# Patient Record
Sex: FEMALE | URBAN - METROPOLITAN AREA
[De-identification: names, ages, dates, MRNs, and addresses within clinical notes are randomized per-mention and may not be internally consistent; named-entity substitution may affect disease eponyms.]

---

## 2018-01-24 ENCOUNTER — IMPORTED ENCOUNTER (OUTPATIENT)
Dept: URBAN - METROPOLITAN AREA CLINIC 31 | Facility: CLINIC | Age: 18
End: 2018-01-24

## 2018-01-24 PROCEDURE — 92310 CONTACT LENS FITTING OU: CPT

## 2018-01-24 PROCEDURE — 92004 COMPRE OPH EXAM NEW PT 1/>: CPT

## 2018-01-24 PROCEDURE — 92015 DETERMINE REFRACTIVE STATE: CPT

## 2019-03-26 NOTE — PATIENT DISCUSSION
POAG, OU:  INTRAOCULAR PRESSURE IS WITHIN ACCEPTABLE LIMITS. PATIENT INSTRUCTED TO CONTINUE _brimonidine and latanoprost ou_______ AND RETURN FOR FOLLOW-UP AS SCHEDULED.

## 2019-03-26 NOTE — PATIENT DISCUSSION
[facility] Retinal Vein Occlusion Counseling:  I have discussed the diagnosis and its pathophysiology with the patient. I have further explained the need for evaluation of underlying medical disorders with the patient's primary care doctor. Return for follow-up as scheduled or sooner if any change in symptoms.

## 2019-09-13 NOTE — PATIENT DISCUSSION
POAG, OU: INTRAOCULAR PRESSURE IS WITHIN ACCEPTABLE LIMITS. PT INSTRUCTED TO CONTINUE BRIMONIDINE AND LATANOPROST AND RETURN FOR FOLLOW-UP AS SCHEDULED.

## 2020-06-08 NOTE — PATIENT DISCUSSION
POAG, OU: INTRAOCULAR PRESSURE IS WITHIN ACCEPTABLE LIMITS. PT INSTRUCTED TO CONTINUE GTTS AND RETURN FOR FOLLOW-UP AS SCHEDULED.

## 2020-06-08 NOTE — PATIENT DISCUSSION
New Prescription: Maxitrol (neomycin-polymyxin-dexameth): ointment: 3.5-10,000-0.1 mg-unit/g-% a small amount twice a day into affected eye 06-

## 2020-10-28 NOTE — PATIENT DISCUSSION
Continue: Maxitrol (neomycin-polymyxin-dexameth): ointment: 3.5-10,000-0.1 mg-unit/g-% a small amount twice a day into affected eye 06-

## 2020-10-28 NOTE — PATIENT DISCUSSION
New Prescription: TobraDex (tobramycin-dexamethasone): drops,suspension: 0.3-0.1% 1 drop four times a day into both eyes 10-

## 2020-11-11 NOTE — PATIENT DISCUSSION
BLEPHAROCONJUNCTIVITIS, OU, IMPROVING. PT EDUCATION. TAPER TOBRADEX BID OU. MONITOR. RTO AS SCHEDULED.

## 2020-11-11 NOTE — PATIENT DISCUSSION
New Prescription: erythromycin (erythromycin): ointment: 5 mg/gram (0.5 %) a small amount twice a day into both eyes 11-

## 2020-11-11 NOTE — PATIENT DISCUSSION
Continue: TobraDex (tobramycin-dexamethasone): drops,suspension: 0.3-0.1% 1 drop four times a day into both eyes 10-

## 2022-04-02 ASSESSMENT — VISUAL ACUITY
OS_SC: 20/20
OD_CC: J1+14''
OD_CC: 20/200
OD_SC: 20/20
OS_CC: J1+14''
OS_CC: 20/400

## 2022-04-02 ASSESSMENT — TONOMETRY
OD_IOP_MMHG: 14
OS_IOP_MMHG: 14

## 2024-05-20 NOTE — PATIENT DISCUSSION
Anesthesia Post Evaluation    Patient: Chaya Walls    Procedure(s) Performed: Procedure(s) (LRB):  HYSTERECTOMY,VAGINAL,LAPAROSCOPY-ASSISTED,WITH SALPINGO-OOPHORECTOMY (Bilateral)  COLPORRHAPHY, COMBINED ANTEROPOSTERIOR (N/A)  PLACEMENT, TRANSOBTURATOR TAPE (N/A)  CYSTOSCOPY (N/A)  LYSIS, ADHESIONS (N/A)    Final Anesthesia Type: general      Patient location during evaluation: PACU  Patient participation: Yes- Able to Participate  Level of consciousness: awake and alert and oriented  Post-procedure vital signs: reviewed and stable  Pain management: adequate  Airway patency: patent  LEATHA mitigation strategies: Multimodal analgesia  PONV status at discharge: No PONV  Anesthetic complications: no      Cardiovascular status: hemodynamically stable  Respiratory status: unassisted and spontaneous ventilation  Hydration status: euvolemic  Follow-up not needed.              Vitals Value Taken Time   /91 05/20/24 1450   Temp 37 °C (98.6 °F) 05/20/24 1327   Pulse 94 05/20/24 1454   Resp 11 05/20/24 1450   SpO2 100 % 05/20/24 1454   Vitals shown include unfiled device data.      No case tracking events are documented in the log.      Pain/Michelle Score: Michelle Score: 8 (5/20/2024  2:40 PM)           Continue: latanoprost (latanoprost): drops: 0.005% 1 drop at bedtime as directed into both eyes 01-